# Patient Record
Sex: MALE | Race: WHITE
[De-identification: names, ages, dates, MRNs, and addresses within clinical notes are randomized per-mention and may not be internally consistent; named-entity substitution may affect disease eponyms.]

---

## 2020-04-16 ENCOUNTER — HOSPITAL ENCOUNTER (EMERGENCY)
Dept: HOSPITAL 12 - ER | Age: 35
Discharge: HOME | End: 2020-04-16
Payer: COMMERCIAL

## 2020-04-16 VITALS — HEIGHT: 68 IN | WEIGHT: 160 LBS | BODY MASS INDEX: 24.25 KG/M2
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
## 2020-04-16 DIAGNOSIS — S67.197A: Primary | ICD-10-CM
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
## 2020-04-16 DIAGNOSIS — S62.637B: ICD-10-CM
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
## 2020-04-16 PROCEDURE — 99284 EMERGENCY DEPT VISIT MOD MDM: CPT

## 2020-04-16 PROCEDURE — 96372 THER/PROPH/DIAG INJ SC/IM: CPT

## 2020-04-16 PROCEDURE — A4217 STERILE WATER/SALINE, 500 ML: HCPCS

## 2020-04-16 PROCEDURE — 73140 X-RAY EXAM OF FINGER(S): CPT

## 2020-04-16 PROCEDURE — 12042 INTMD RPR N-HF/GENIT2.6-7.5: CPT

## 2020-04-16 PROCEDURE — 0HQGXZZ REPAIR LEFT HAND SKIN, EXTERNAL APPROACH: ICD-10-PCS

## 2020-04-16 PROCEDURE — A4663 DIALYSIS BLOOD PRESSURE CUFF: HCPCS

## 2020-04-16 NOTE — NUR
patient was seen by MD.  Xrays and sutures complete.  Wrapped with sterile 
gauze and place splint.



DC, Rx and follow up instructions given and explained to patient who states he 
understands all instructions.

## 2020-04-18 ENCOUNTER — HOSPITAL ENCOUNTER (EMERGENCY)
Dept: HOSPITAL 12 - ER | Age: 35
Discharge: HOME | End: 2020-04-18
Payer: COMMERCIAL

## 2020-04-18 VITALS — WEIGHT: 160 LBS | HEIGHT: 68 IN | BODY MASS INDEX: 24.25 KG/M2

## 2020-04-18 DIAGNOSIS — S62.637D: Primary | ICD-10-CM
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
## 2020-04-18 PROCEDURE — A4663 DIALYSIS BLOOD PRESSURE CUFF: HCPCS

## 2020-04-18 PROCEDURE — A4217 STERILE WATER/SALINE, 500 ML: HCPCS

## 2020-04-18 NOTE — NUR
Patient discharged to home in stable condition.  Written and verbal after care 
instructions given to patient. Patient verbalized understanding & compliance of 
instructions. Stressed and emphasis were given for follow up with a hand 
surgeon (Dr Haro) or return to ER for worsening s/s.